# Patient Record
(demographics unavailable — no encounter records)

---

## 2024-12-03 NOTE — PHYSICAL EXAM
[Clear] : right tympanic membrane clear [NL] : warm, clear [FreeTextEntry1] : WELL-APPEARING [FreeTextEntry3] : LEFT CERUMEN IMPACTION, DIME-SIZED AMOUNT OF DARK CERUMEN REMOVED WITH CURETTE [FreeTextEntry4] : DRIED MUCOUS IN NARES

## 2024-12-07 NOTE — DISCUSSION/SUMMARY
[FreeTextEntry1] : Recommend antibiotics, nasal saline. Side effect of treatment includes but not limited to diarrhea. Return if symptoms worsen or persist.

## 2024-12-07 NOTE — HISTORY OF PRESENT ILLNESS
[de-identified] : BARKY COUGH; HAS BEEN WITH RHINORRHEA/NASAL CONGESTION OVER 2 WEEKS; INITIALY IMPROVED, HOWEVER NOW WITH WET PRODUCTIVE COUGH; DENIES FEVER; HAS DECREASED APPETITE; DENIES SHORTNESS OF BREATH

## 2024-12-07 NOTE — PHYSICAL EXAM
[EOMI] : grossly EOMI [Conjuctival Injection] : conjunctival injection [Increased Tearing] : no increased tearing [Eyelid Swelling] : no eyelid swelling [Inflamed Nasal Mucosa] : inflamed nasal mucosa [Erythematous Oropharynx] : erythematous oropharynx [Cobblestoning] : cobblestoning of posterior pharynx [NL] : soft, nontender, nondistended, normal bowel sounds, no hepatosplenomegaly [FreeTextEntry4] : PURULENTNASAL SECRETION

## 2025-01-08 NOTE — ASSESSMENT
[FreeTextEntry1] : PND ( Post Nasal Drip)  Start: Amoxicillin-Pot Clavulanate 600-42.9 MG/5ML Oral Suspension Reconstituted (Augmentin ES-600); TAKE 5.5 ML Twice daily X 14 Days   F/U within 3 Weeks

## 2025-01-08 NOTE — HISTORY OF PRESENT ILLNESS
[de-identified] : Forest Garcia, a 5-year-old, visited with his mother for a sick visit. During the third week of November, he experienced a wet cough, swollen tonsils, and slight adenoidal enlargement. On Sunday, Forest ran a fever, had significant mucus production, and had a decreased appetite. He also had an ear infection in his left ear infection and continues to cough. When he sleeps, he snores and has difficulty falling back asleep.

## 2025-01-08 NOTE — IMPRESSION
[Pulmonary Function Test] : Pulmonary Function Test [Normal Spirometry] : spirometry normal [FreeTextEntry1] : FEV1 is 85% of predicted

## 2025-01-08 NOTE — REASON FOR VISIT
[Sick Visit] : a sick visit [Hay Fever] : hay fever [Cough] : cough [Congestion] : congestion [Mother] : mother

## 2025-01-09 NOTE — END OF VISIT
[Time Spent: ___ minutes] : I have spent [unfilled] minutes of time on the encounter which excludes teaching and separately reported services.
English

## 2025-01-31 NOTE — DISCUSSION/SUMMARY
[FreeTextEntry1] :  5 year boy with URI symptoms, likely secondary to viral illness. RVP pending.   ENT evaluation given recurrent sinusitis.

## 2025-01-31 NOTE — REVIEW OF SYSTEMS
[Fever] : fever [Nasal Discharge] : nasal discharge [Nasal Congestion] : nasal congestion [Tachypnea] : not tachypneic [Wheezing] : no wheezing [Cough] : cough [Negative] : Gastrointestinal

## 2025-01-31 NOTE — PHYSICAL EXAM
[Acute Distress] : no acute distress [Alert] : alert [EOMI] : grossly EOMI [Conjuctival Injection] : no conjunctival injection [Eyelid Swelling] : no eyelid swelling [Clear] : right tympanic membrane clear [Inflamed Nasal Mucosa] : inflamed nasal mucosa [Clear to Auscultation Bilaterally] : clear to auscultation bilaterally [NL] : regular rate and rhythm, normal S1, S2 audible, no murmurs [de-identified] : MMM

## 2025-01-31 NOTE — HISTORY OF PRESENT ILLNESS
[de-identified] : FEVER, RUNNY NOSE  [FreeTextEntry6] :  6 yo boy with Fevre tm 101, cough, congestion since yesterday. No shortness of breath or diff breathing. normal activity. normal po intake. no abdominal pain, n/v/d  Patient has had multiple treatments for Bacterial sinusitis over the past year - March 2024, October 2024, December 2024, January 2024 + Additional course prescribed by ENT (outside of [facility]) - Mother states every times his symptoms start like this he ends up needing Antibiotics.

## 2025-03-11 NOTE — HISTORY OF PRESENT ILLNESS
[de-identified] : Forest presents with mom who reports the following history. He recently returned from vacation in [place]. Mom reports that he was playing in a kiddie pool and when mom went to empty it, she noticed there was mold. She feels the mold exposure has triggered a sinus infection. Since then he has had an ongoing congested cough, heavy breathing and nasal congestion. He has been coughing through the night. Mom reports that she needs to prop him up and tilt his head a certain way in order for him to feel comfortable at night. He has not slept soundly for the past 4 days due to waking up from the cough. This morning, he was coughing up thick "globs" of mucus which were discolored. He had a low-grade fever earlier in the week. Mom has been giving him saline sprays, Fluticasone, Tylenol and/or Motrin. Appetite is decreased.

## 2025-03-11 NOTE — PHYSICAL EXAM
[Alert] : alert [Healthy Appearance] : healthy appearance [No Acute Distress] : no acute distress [Normal Pupil & Iris Size/Symmetry] : normal pupil and iris size and symmetry [No Discharge] : no discharge [Sclera Not Icteric] : sclera not icteric [Normal TMs] : both tympanic membranes were normal [Normal Lips/Tongue] : the lips and tongue were normal [Normal Outer Ear/Nose] : the ears and nose were normal in appearance [No Thrush] : no thrush [No LAD] : no lymphadenopathy [Normal Rate and Effort] : normal respiratory rhythm and effort [No Crackles] : no crackles [No Retractions] : no retractions [Bilateral Audible Breath Sounds] : bilateral audible breath sounds [Normal Rate] : heart rate was normal  [Normal S1, S2] : normal S1 and S2 [No murmur] : no murmur [Regular Rhythm] : with a regular rhythm [Skin Intact] : skin intact  [No Rash] : no rash [No Skin Lesions] : no skin lesions [Normal Mood] : mood was normal [Normal Affect] : affect was normal [Judgment and Insight Age Appropriate] : judgement and insight is age appropriate [de-identified] : Inflamed nasal mucosa, mouth breather [de-identified] : No wheeze detected

## 2025-03-11 NOTE — ASSESSMENT
[FreeTextEntry1] : Postnasal drip cough secondary to sinusitis Augmentin  5ml BID for 10 days Pt weighs 31 lbs Continue Fluticasone 1 spray each nostril QD

## 2025-03-11 NOTE — IMPRESSION
[Spirometry] : Spirometry [FreeTextEntry1] : FEV1=75% of predicted Variable effort give- pt is 5 years old

## 2025-03-15 NOTE — HISTORY OF PRESENT ILLNESS
[de-identified] : FUNGAL INFECTION ON LEFT HIP AND BUTT [FreeTextEntry6] : 5 yr old M with >1 week dry, slightly itchy patch on left buttock. Mom started to treat w/ an old Clotrimazole but the rash came back as soon as she ran out. It has spread a little. Denies warmth, pain, fever. Currently on Augmentin for sinus infection. Continued cough.

## 2025-03-15 NOTE — PHYSICAL EXAM
[Tired appearing] : not tired appearing [Lethargic] : not lethargic [Enlarged Tonsils] : tonsils not enlarged [Vesicles] : no vesicles [Wheezing] : no wheezing [Crackles] : no crackles [Transmitted Upper Airway Sounds] : no transmitted upper airway sounds [Tachypnea] : no tachypnea [Belly Breathing] : no belly breathing [NL] : moves all extremities x4, warm, well perfused x4 [Warm] : warm [Dry] : dry [Central Clearing] : central clearing [Patches] : patches [Buttocks] : buttocks

## 2025-03-15 NOTE — DISCUSSION/SUMMARY
[FreeTextEntry1] : 5 yr old M with sinus infection and fungal rash of buttock, no evidence of 2/2 infection.  Ketoconazole once daily x10 days Moisturizing ointment prn C/w abx Return as needed for no improvement or worsening

## 2025-05-21 NOTE — DISCUSSION/SUMMARY
[FreeTextEntry1] : 6 y/o M w/ presentation concerning for acute URI  Plan: 1. Respiratory pathogen panel 2. Supportive care with Tylenol/Motrin PRN, increased fluids, keeping head elevated and rest  3. Monitor and return with any new or worsening symptoms.

## 2025-05-21 NOTE — HISTORY OF PRESENT ILLNESS
[de-identified] : Cough and runny nose for approximately 1 week. [FreeTextEntry6] : 6 y/o M complaining of cough and rhinorrhea x~10 days. Denies any fever, SOB or change in appetite.  Pt c/o L sided facial pain and swelling x2 days. Atraumatic. -NKDA. Denies difficulty breathing/swallowing/speaking. Pt is wheelchair bound, hx of polio, HTN, and DM. Denies ACE inhibitor use or fevers.